# Patient Record
Sex: MALE | Race: WHITE | NOT HISPANIC OR LATINO
[De-identification: names, ages, dates, MRNs, and addresses within clinical notes are randomized per-mention and may not be internally consistent; named-entity substitution may affect disease eponyms.]

---

## 2021-01-19 ENCOUNTER — APPOINTMENT (OUTPATIENT)
Dept: OTOLARYNGOLOGY | Facility: CLINIC | Age: 81
End: 2021-01-19
Payer: MEDICARE

## 2021-01-19 VITALS
DIASTOLIC BLOOD PRESSURE: 78 MMHG | WEIGHT: 164 LBS | HEIGHT: 65 IN | SYSTOLIC BLOOD PRESSURE: 148 MMHG | BODY MASS INDEX: 27.32 KG/M2 | HEART RATE: 77 BPM | TEMPERATURE: 97.2 F

## 2021-01-19 DIAGNOSIS — H61.23 IMPACTED CERUMEN, BILATERAL: ICD-10-CM

## 2021-01-19 DIAGNOSIS — J33.9 NASAL POLYP, UNSPECIFIED: ICD-10-CM

## 2021-01-19 DIAGNOSIS — H90.3 SENSORINEURAL HEARING LOSS, BILATERAL: ICD-10-CM

## 2021-01-19 PROCEDURE — 99212 OFFICE O/P EST SF 10 MIN: CPT | Mod: 25

## 2021-01-19 PROCEDURE — 69210 REMOVE IMPACTED EAR WAX UNI: CPT

## 2021-01-19 NOTE — PHYSICAL EXAM
[de-identified] : CI AU [de-identified] : enlarged w bilat nasal polyps [Normal] : mucosa is normal [Midline] : trachea located in midline position

## 2021-01-19 NOTE — REVIEW OF SYSTEMS
[Seasonal Allergies] : seasonal allergies [Ear Pain] : ear pain [Ear Itch] : ear itch [Feeling Tired] : feeling tired [Joint Pain] : joint pain [As Noted in HPI] : as noted in HPI [Negative] : Head and Neck [de-identified] : +hx PNS polyps, never wanted surgery

## 2021-01-19 NOTE — HISTORY OF PRESENT ILLNESS
[de-identified] : 80 yr old male c/o clogged ears for 2 days.  Used Debrox yesterday w temporary improvement in hearing. \par -otalgia, tinnitus, dizzy\par -Qtips\par +itchy ears uses a steroid cream as per his dermatologist\par -hx otitis, noise exp\par +FH mother presby\par +head trauma w LOC\par +known SNHL